# Patient Record
Sex: MALE | Race: WHITE
[De-identification: names, ages, dates, MRNs, and addresses within clinical notes are randomized per-mention and may not be internally consistent; named-entity substitution may affect disease eponyms.]

---

## 2017-08-18 ENCOUNTER — HOSPITAL ENCOUNTER (EMERGENCY)
Dept: HOSPITAL 53 - M ED | Age: 57
Discharge: HOME | End: 2017-08-18
Payer: MEDICARE

## 2017-08-18 VITALS — SYSTOLIC BLOOD PRESSURE: 134 MMHG | DIASTOLIC BLOOD PRESSURE: 67 MMHG

## 2017-08-18 VITALS — WEIGHT: 28.26 LBS | BODY MASS INDEX: 3.75 KG/M2 | HEIGHT: 73 IN

## 2017-08-18 DIAGNOSIS — Z79.84: ICD-10-CM

## 2017-08-18 DIAGNOSIS — Z79.899: ICD-10-CM

## 2017-08-18 DIAGNOSIS — R07.89: Primary | ICD-10-CM

## 2017-08-18 DIAGNOSIS — E78.5: ICD-10-CM

## 2017-08-18 DIAGNOSIS — R51: ICD-10-CM

## 2017-08-18 DIAGNOSIS — E11.9: ICD-10-CM

## 2017-08-18 LAB
ANION GAP SERPL CALC-SCNC: 9 MEQ/L (ref 8–16)
BASOPHILS # BLD AUTO: 0 K/MM3 (ref 0–0.2)
BASOPHILS NFR BLD AUTO: 0.6 % (ref 0–1)
BUN SERPL-MCNC: 13 MG/DL (ref 7–18)
CALCIUM SERPL-MCNC: 9 MG/DL (ref 8.5–10.1)
CHLORIDE SERPL-SCNC: 112 MEQ/L (ref 98–107)
CO2 SERPL-SCNC: 25 MEQ/L (ref 21–32)
CREAT SERPL-MCNC: 0.95 MG/DL (ref 0.7–1.3)
EOSINOPHIL # BLD AUTO: 0.2 K/MM3 (ref 0–0.5)
EOSINOPHIL NFR BLD AUTO: 2.9 % (ref 0–3)
ERYTHROCYTE [DISTWIDTH] IN BLOOD BY AUTOMATED COUNT: 13.1 % (ref 11.5–14.5)
GFR SERPL CREATININE-BSD FRML MDRD: > 60 ML/MIN/{1.73_M2} (ref 56–?)
GLUCOSE SERPL-MCNC: 104 MG/DL (ref 70–105)
LARGE UNSTAINED CELL #: 0.2 K/MM3 (ref 0–0.4)
LARGE UNSTAINED CELL %: 2.3 % (ref 0–4)
LYMPHOCYTES # BLD AUTO: 2.5 K/MM3 (ref 1.5–4.5)
LYMPHOCYTES NFR BLD AUTO: 29.9 % (ref 24–44)
MCH RBC QN AUTO: 29.9 PG (ref 27–33)
MCHC RBC AUTO-ENTMCNC: 34.5 G/DL (ref 32–36.5)
MCV RBC AUTO: 86.6 FL (ref 80–96)
MONOCYTES # BLD AUTO: 0.5 K/MM3 (ref 0–0.8)
MONOCYTES NFR BLD AUTO: 6 % (ref 0–5)
NEUTROPHILS # BLD AUTO: 4.5 K/MM3 (ref 1.8–7.7)
NEUTROPHILS NFR BLD AUTO: 58.3 % (ref 36–66)
PLATELET # BLD AUTO: 258 K/MM3 (ref 150–450)
POTASSIUM SERPL-SCNC: 4.2 MEQ/L (ref 3.5–5.1)
SODIUM SERPL-SCNC: 146 MEQ/L (ref 136–145)
WBC # BLD AUTO: 7.7 K/MM3 (ref 4–10)

## 2017-08-18 PROCEDURE — 82553 CREATINE MB FRACTION: CPT

## 2017-08-18 PROCEDURE — 83880 ASSAY OF NATRIURETIC PEPTIDE: CPT

## 2017-08-18 PROCEDURE — 85025 COMPLETE CBC W/AUTO DIFF WBC: CPT

## 2017-08-18 PROCEDURE — 71275 CT ANGIOGRAPHY CHEST: CPT

## 2017-08-18 PROCEDURE — 80048 BASIC METABOLIC PNL TOTAL CA: CPT

## 2017-08-18 PROCEDURE — 93005 ELECTROCARDIOGRAM TRACING: CPT

## 2017-08-18 PROCEDURE — 84484 ASSAY OF TROPONIN QUANT: CPT

## 2017-08-18 PROCEDURE — 71010: CPT

## 2017-08-18 PROCEDURE — 93041 RHYTHM ECG TRACING: CPT

## 2017-08-18 PROCEDURE — 96374 THER/PROPH/DIAG INJ IV PUSH: CPT

## 2017-08-18 PROCEDURE — 82550 ASSAY OF CK (CPK): CPT

## 2017-08-18 PROCEDURE — 99285 EMERGENCY DEPT VISIT HI MDM: CPT

## 2017-08-18 PROCEDURE — 94760 N-INVAS EAR/PLS OXIMETRY 1: CPT

## 2017-08-18 NOTE — REP
CT ANGIOGRAM OF THE CHEST:

 

TECHNIQUE:  Axial contrast enhanced images from the thoracic inlet to the upper

abdomen using 100 mL Isovue 370 intravenous contrast material with multiplanar

reformations.

 

There are bibasilar fibro atelectatic changes. No acute infiltrate is seen in

either lung. There is no thoracic aortic aneurysm or dissection. Pulmonary

arteries are not optimally visualized due to breathing motion but no central

pulmonary embolism is seen. There is no mediastinal, hilar, or chest wall

lymphadenopathy. There is no pleural or pericardial effusion. Heart is normal in

size. Visualized upper abdominal structures appear unremarkable.

 

IMPRESSION:

 

No thoracic aortic aneurysm or dissection. No central pulmonary embolism, the

more peripheral pulmonary artery branches are not optimally seen due to breathing

motion.

 

 

Signed by

Kodak Bird MD 08/21/2017 08:59 A

## 2017-08-18 NOTE — REP
PORTABLE CHEST, SINGLE VIEW:

 

COMPARISON: 07/03/2014

 

There is mild bibasilar fibroatelectatic change. There is no acute infiltrate.

The heart is normal in size.  Mediastinal silhouette is unremarkable.

 

IMPRESSION:

 

Mild bibasilar fibroatelectatic change.  No acute infiltrate.

 

 

Signed by

Kodak Bird MD 08/18/2017 04:47 P

## 2017-08-19 NOTE — ECGEPIP
Stationary ECG Study

                           [facility] - ED

                                       

                                       Test Date:    2017

Pat Name:     NOY PLATA           Department:   

Patient ID:   [id number]                 Room:         -

Gender:       M                        Technician:   rn

:          1960               Requested By: MARY DIEGO

Order Number: [id number]     Reading MD:   Angie Damon

                                 Measurements

Intervals                              Axis          

Rate:         78                       P:            34

WV:           183                      QRS:          23

QRSD:         97                       T:            1

QT:           363                                    

QTc:          414                                    

                           Interpretive Statements

SINUS RHYTHM

NSTTW ABNORMALITY

DECREASED RATE 16

Electronically Signed On 2017 8:15:45 EDT by Angie Damon

## 2017-10-05 ENCOUNTER — HOSPITAL ENCOUNTER (OUTPATIENT)
Dept: HOSPITAL 53 - M SLEEP | Age: 57
End: 2017-10-05
Attending: NURSE PRACTITIONER
Payer: MEDICARE

## 2017-10-05 DIAGNOSIS — G47.33: Primary | ICD-10-CM

## 2017-10-10 NOTE — SLEEPCENT
DATE OF PROCEDURE:   10/05/2017

 

 

REQUESTING PROVIDER:  Dalila Coyle NP

 

INTERPRETATION: Nocturnal polysomnography was performed due to concern for the

obstructive sleep apnea syndrome in this patient with a prior history of

obstructive sleep apnea, comorbidities of left ventricular hypertrophy and

posttraumatic stress disorder (PTSD).

 

6 hours and 54 minutes of data were reviewed.  There were 263 minutes of sleep

identified.  Sleep latency was prolonged at 47 minutes.  Rapid eye movement (REM)

latency was prolonged at 236 minutes. Sleep architecture showed poor progression

with two brief REM episodes noted.  Overall sleep efficiency was 64.5%.

Electrocardiogram (EKG) showed a sinus rhythm with frequency PVCs.  Average heart

rate 62 beats per minute.   Electroencephalogram (EEG) showed some coarsening in

the background.  No focal events were identified.  There 38 respiratory events

identified of 10 seconds in duration or greater for an apnea hypopnea index of

8.7.   The events were primarily obstructive, not exclusive to sleep stage nor

body posture.  Respiratory related arousals occurred 1.6 times per hour.  Some

limb activity was noted but arousals from limb events were few.  Oxygen

desaturations surrounding respiratory events fell into the 80s.

 

IMPRESSION:  Obstructive sleep apnea syndrome (G47.33).  Apnea-hypopnea index of

8.7.

 

RECOMMENDATIONS:   The patient should be encouraged to return to the sleep

disorder center for pressure therapy.  In the interim, alcohol and sedative

avoidance should be practiced and caution exercised during the operation of motor

vehicles.

## 2019-02-06 ENCOUNTER — HOSPITAL ENCOUNTER (EMERGENCY)
Dept: HOSPITAL 53 - M ED | Age: 59
Discharge: LEFT BEFORE DISCHARGE | End: 2019-02-06
Payer: MEDICARE

## 2019-02-06 VITALS — SYSTOLIC BLOOD PRESSURE: 145 MMHG | DIASTOLIC BLOOD PRESSURE: 82 MMHG

## 2019-02-06 VITALS — HEIGHT: 72 IN | WEIGHT: 285.61 LBS | BODY MASS INDEX: 38.68 KG/M2

## 2019-02-06 DIAGNOSIS — R07.9: Primary | ICD-10-CM

## 2019-02-06 DIAGNOSIS — G20: ICD-10-CM

## 2019-02-06 DIAGNOSIS — E78.5: ICD-10-CM

## 2019-02-06 LAB
ALBUMIN SERPL BCG-MCNC: 3.3 GM/DL (ref 3.2–5.2)
ALT SERPL W P-5'-P-CCNC: 62 U/L (ref 12–78)
BASOPHILS # BLD AUTO: 0 10^3/UL (ref 0–0.2)
BASOPHILS NFR BLD AUTO: 0.3 % (ref 0–1)
BILIRUB CONJ SERPL-MCNC: < 0.1 MG/DL (ref 0–0.2)
BILIRUB SERPL-MCNC: 0.3 MG/DL (ref 0.2–1)
BUN SERPL-MCNC: 10 MG/DL (ref 7–18)
CALCIUM SERPL-MCNC: 8.5 MG/DL (ref 8.8–10.2)
CHLORIDE SERPL-SCNC: 99 MEQ/L (ref 98–107)
CK MB CFR.DF SERPL CALC: 2.43
CK MB SERPL-MCNC: 2 NG/ML (ref ?–3.6)
CK SERPL-CCNC: 70 U/L (ref 39–308)
CO2 SERPL-SCNC: 26 MEQ/L (ref 21–32)
CREAT SERPL-MCNC: 0.93 MG/DL (ref 0.7–1.3)
D DIMER PPP DDU-MCNC: < 270 NG/ML (ref ?–500)
EOSINOPHIL # BLD AUTO: 0.2 10^3/UL (ref 0–0.5)
EOSINOPHIL NFR BLD AUTO: 2.5 % (ref 0–3)
GFR SERPL CREATININE-BSD FRML MDRD: > 60 ML/MIN/{1.73_M2} (ref 49–?)
GLUCOSE SERPL-MCNC: 424 MG/DL (ref 70–100)
HCT VFR BLD AUTO: 40.8 % (ref 42–52)
HGB BLD-MCNC: 13.7 G/DL (ref 13.5–17.5)
INR PPP: 0.91
LIPASE SERPL-CCNC: 211 U/L (ref 73–393)
LYMPHOCYTES # BLD AUTO: 2.2 10^3/UL (ref 1.5–4.5)
LYMPHOCYTES NFR BLD AUTO: 25.2 % (ref 24–44)
MAGNESIUM SERPL-MCNC: 1.9 MG/DL (ref 1.8–2.4)
MCH RBC QN AUTO: 29.3 PG (ref 27–33)
MCHC RBC AUTO-ENTMCNC: 33.6 G/DL (ref 32–36.5)
MCV RBC AUTO: 87.2 FL (ref 80–96)
MONOCYTES # BLD AUTO: 0.7 10^3/UL (ref 0–0.8)
MONOCYTES NFR BLD AUTO: 7.6 % (ref 0–5)
NEUTROPHILS # BLD AUTO: 5.6 10^3/UL (ref 1.8–7.7)
NEUTROPHILS NFR BLD AUTO: 64.1 % (ref 36–66)
NT-PRO BNP: 22 PG/ML (ref ?–125)
PLATELET # BLD AUTO: 259 10^3/UL (ref 150–450)
POTASSIUM SERPL-SCNC: 4 MEQ/L (ref 3.5–5.1)
PROT SERPL-MCNC: 7.4 GM/DL (ref 6.4–8.2)
PROTHROMBIN TIME: 12.3 SECONDS (ref 12.1–14.4)
RBC # BLD AUTO: 4.68 10^6/UL (ref 4.3–6.1)
SODIUM SERPL-SCNC: 133 MEQ/L (ref 136–145)
TROPONIN I SERPL-MCNC: < 0.02 NG/ML (ref ?–0.1)
TSH SERPL DL<=0.005 MIU/L-ACNC: 3.53 UIU/ML (ref 0.36–3.74)
WBC # BLD AUTO: 8.7 10^3/UL (ref 4–10)

## 2019-02-06 NOTE — REP
Chest one-view

 

HISTORY: Chest pain

 

Comparison: 04/01/2018

 

The lungs are clear.  The heart is normal in size.  The pulmonary vasculature is

normal in appearance.

 

Impression:  No acute disease.

 

 

Electronically Signed by

Kenrick Alvarado MD 02/06/2019 11:08 A

## 2019-02-06 NOTE — ECGEPIP
Stationary ECG Study

                           [facility] - ED

                                       

                                       Test Date:    2019

Pat Name:     NOY PLATA           Department:   

Patient ID:   [id number]                 Room:         -

Gender:       M                        Technician:   

:          1960               Requested By: Angie Damon 

Order Number: [id number]     Reading MD:   Nomi Freedman

                                 Measurements

Intervals                              Axis          

Rate:         70                       P:            53

NE:           140                      QRS:          43

QRSD:         105                      T:            3

QT:           379                                    

QTc:          410                                    

                           Interpretive Statements

SINUS RHYTHM WITH OCCASIONAL PREMATURE VENTRICULAR COMPLEXES

NONSPECIFIC T-WAVE ABNORMALITY

SIMILAR TO 18

 

Electronically Signed On 2019 21:10:45 EST by Nomi Freedman

## 2019-12-16 ENCOUNTER — HOSPITAL ENCOUNTER (OUTPATIENT)
Dept: HOSPITAL 53 - M RAD | Age: 59
End: 2019-12-16
Payer: MEDICARE

## 2019-12-16 DIAGNOSIS — R22.1: Primary | ICD-10-CM

## 2019-12-16 PROCEDURE — 70491 CT SOFT TISSUE NECK W/DYE: CPT

## 2019-12-16 PROCEDURE — 70470 CT HEAD/BRAIN W/O & W/DYE: CPT

## 2019-12-16 NOTE — REP
CT brain:  New 12/16/2019.

 

Indication:  Neck mass.  Metastatic workup.

 

Comparison:  07/03/2014.

 

Technique:  Axial images of the brain were obtained from skull base to vertex

including post contrast imaging.  75 ml IV Isovue 370 were administered.

 

Findings:

 

There is no acute intracranial hemorrhage, acute cortical infarction, mass

effect, hydrocephalus or pathologic contrast enhancement.  Mild diffuse volume

loss is present.  There is no significant fluid within the paranasal

sinuses/mastoid air cells.  The calvarium is intact.

 

Impression:

 

No acute intracranial process or evidence of intracranial metastatic disease.

 

 

Electronically Signed by

Juanito Alaniz DO 12/16/2019 04:29 P

## 2019-12-16 NOTE — REP
CT neck:  12/16/2019.

 

Indication:  Neck mass.

 

Comparison:  None.

 

Technique:  Axial imaging of the neck soft tissues were obtained following IV

administration of 75 ml Isovue 370.  Sagittal and coronal reconstructions were

provided.

 

Findings:

 

There is no abnormal solid soft tissue mass, abnormal fluid collection or

cervical lymphadenopathy.  No significant vascular abnormalities are detected.

Visualized lungs are clear.  The airway is patent.  No ocular, intraorbital or

intracranial abnormalities are detected.

 

Impression:

 

No abnormal solid soft tissue mass, abnormal fluid collection or cervical

lymphadenopathy.

 

 

Electronically Signed by

Juanito Alaniz DO 12/16/2019 04:59 P

## 2020-02-12 ENCOUNTER — HOSPITAL ENCOUNTER (OUTPATIENT)
Dept: HOSPITAL 53 - M LABNEURO | Age: 60
End: 2020-02-12
Attending: PHYSICIAN ASSISTANT
Payer: MEDICARE

## 2020-02-12 DIAGNOSIS — R25.1: Primary | ICD-10-CM

## 2020-02-18 ENCOUNTER — HOSPITAL ENCOUNTER (OUTPATIENT)
Dept: HOSPITAL 53 - M OPP | Age: 60
Discharge: HOME | End: 2020-02-18
Attending: INTERNAL MEDICINE
Payer: MEDICARE

## 2020-02-18 VITALS — WEIGHT: 280 LBS | BODY MASS INDEX: 37.93 KG/M2 | HEIGHT: 72 IN

## 2020-02-18 VITALS — DIASTOLIC BLOOD PRESSURE: 94 MMHG | SYSTOLIC BLOOD PRESSURE: 173 MMHG

## 2020-02-18 DIAGNOSIS — Z79.899: ICD-10-CM

## 2020-02-18 DIAGNOSIS — Z12.11: Primary | ICD-10-CM

## 2020-02-18 DIAGNOSIS — F17.220: ICD-10-CM

## 2020-02-18 DIAGNOSIS — K64.0: ICD-10-CM

## 2020-02-18 DIAGNOSIS — Z79.891: ICD-10-CM

## 2020-02-18 NOTE — ROOR
________________________________________________________________________________

Patient Name: Kodak Ritchie           Procedure Date: 2/18/2020 12:09 PM

MRN: [id number]                          Account Number: [id number]

YOB: 1960               Age: 59

Room: [facility]                            Gender: Male

Note Status: Finalized                 

________________________________________________________________________________

 

Procedure:           Total Colonoscopy to Cecum

Indications:         Screening for colorectal malignant neoplasm

Providers:           Axel Bennett MD

Referring MD:        Kenji Diaz Md

Requesting Provider: 

Medicines:           Monitored Anesthesia Care

Complications:       No immediate complications.

________________________________________________________________________________

Procedure:           Pre-Anesthesia Assessment:

                     - The heart rate, respiratory rate, oxygen saturations, 

                     blood pressure, adequacy of pulmonary ventilation, and 

                     response to care were monitored throughout the procedure.

                     The Colonoscope was introduced through the anus and 

                     advanced to the cecum, identified by appendiceal orifice 

                     and ileocecal valve. The colonoscopy was performed 

                     without difficulty. The patient tolerated the procedure 

                     well. The quality of the bowel preparation was good.

                                                                                

Findings:

     The perianal and digital rectal examinations were normal.

     Non-bleeding internal hemorrhoids were found during retroflexion. The 

     hemorrhoids were small and Grade I (internal hemorrhoids that do not 

     prolapse).

     No other significant abnormalities were identified in a careful 

     examination of the remainder of the colon.

     The exam was otherwise without abnormality on direct and retroflexion 

     views.

                                                                                

Impression:          - Non-bleeding internal hemorrhoids.

                     - The examination was otherwise normal on direct and 

                     retroflexion views.

                     - No specimens collected.

                     - The exam was otherwise normal to the cecum.

Recommendation:      - Patient has a contact number available for emergencies. 

                     The signs and symptoms of potential delayed complications 

                     were discussed with the patient. Return to normal 

                     activities tomorrow. Written discharge instructions were 

                     provided to the patient.

                     - High fiber diet.

                     - Discharge patient to home.

                     - Continue present medications.

                     - Repeat colonoscopy in 10 years for screening purposes.

                     - Return to referring physician.

                     - The findings and recommendations were discussed with 

                     the patient's family.

                                                                                

 

Axel Bennett MD

____________________

Axel Bennett MD

2/18/2020 12:42:00 PM

Electronically signed by Axel Bennett MD

Number of Addenda: 0

 

Note Initiated On: 2/18/2020 12:09 PM

Estimated Blood Loss:

     Estimated blood loss: none.

## 2021-04-01 ENCOUNTER — HOSPITAL ENCOUNTER (OUTPATIENT)
Dept: HOSPITAL 53 - M LAB REF | Age: 61
End: 2021-04-01
Attending: PHYSICIAN ASSISTANT
Payer: MEDICARE

## 2021-04-01 DIAGNOSIS — Z79.899: ICD-10-CM

## 2021-04-01 DIAGNOSIS — R53.83: Primary | ICD-10-CM

## 2021-04-01 LAB
25(OH)D3 SERPL-MCNC: 12.2 NG/ML (ref 30–100)
ALBUMIN SERPL BCG-MCNC: 3.6 GM/DL (ref 3.2–5.2)
ALT SERPL W P-5'-P-CCNC: 43 U/L (ref 12–78)
BASOPHILS # BLD AUTO: 0.1 10^3/UL (ref 0–0.2)
BASOPHILS NFR BLD AUTO: 0.7 % (ref 0–1)
BILIRUB SERPL-MCNC: 0.2 MG/DL (ref 0.2–1)
BUN SERPL-MCNC: 16 MG/DL (ref 7–18)
CALCIUM SERPL-MCNC: 8.9 MG/DL (ref 8.8–10.2)
CHLORIDE SERPL-SCNC: 99 MEQ/L (ref 98–107)
CO2 SERPL-SCNC: 26 MEQ/L (ref 21–32)
CREAT SERPL-MCNC: 0.78 MG/DL (ref 0.7–1.3)
EOSINOPHIL # BLD AUTO: 0.2 10^3/UL (ref 0–0.5)
EOSINOPHIL NFR BLD AUTO: 3.1 % (ref 0–3)
GFR SERPL CREATININE-BSD FRML MDRD: > 60 ML/MIN/{1.73_M2} (ref 49–?)
GLUCOSE SERPL-MCNC: 280 MG/DL (ref 70–100)
HCT VFR BLD AUTO: 42.3 % (ref 42–52)
HETEROPH AB SER QL LA: NEGATIVE
HGB BLD-MCNC: 13.9 G/DL (ref 13.5–17.5)
IRON SATN MFR SERPL: 22 % (ref 19.7–50)
IRON SERPL-MCNC: 67 UG/DL (ref 65–175)
LYMPHOCYTES # BLD AUTO: 2.5 10^3/UL (ref 1.5–5)
LYMPHOCYTES NFR BLD AUTO: 34.9 % (ref 24–44)
MCH RBC QN AUTO: 28.5 PG (ref 27–33)
MCHC RBC AUTO-ENTMCNC: 32.9 G/DL (ref 32–36.5)
MCV RBC AUTO: 86.7 FL (ref 80–96)
MONOCYTES # BLD AUTO: 0.6 10^3/UL (ref 0–0.8)
MONOCYTES NFR BLD AUTO: 8.1 % (ref 2–8)
NEUTROPHILS # BLD AUTO: 3.7 10^3/UL (ref 1.5–8.5)
NEUTROPHILS NFR BLD AUTO: 52.9 % (ref 36–66)
PLATELET # BLD AUTO: 250 10^3/UL (ref 150–450)
POTASSIUM SERPL-SCNC: 4.2 MEQ/L (ref 3.5–5.1)
PROT SERPL-MCNC: 7.4 GM/DL (ref 6.4–8.2)
RBC # BLD AUTO: 4.88 10^6/UL (ref 4.3–6.1)
SODIUM SERPL-SCNC: 132 MEQ/L (ref 136–145)
T3 SERPL-MCNC: 109 NG/DL (ref 60–181)
T4 FREE SERPL-MCNC: 0.92 NG/DL (ref 0.76–1.46)
TIBC SERPL-MCNC: 305 UG/DL (ref 250–450)
TSH SERPL DL<=0.005 MIU/L-ACNC: 2.43 UIU/ML (ref 0.36–3.74)
WBC # BLD AUTO: 7.1 10^3/UL (ref 4–10)

## 2021-09-12 ENCOUNTER — HOSPITAL ENCOUNTER (OUTPATIENT)
Dept: HOSPITAL 53 - M ED | Age: 61
Setting detail: OBSERVATION
LOS: 1 days | Discharge: HOME | End: 2021-09-13
Attending: INTERNAL MEDICINE | Admitting: INTERNAL MEDICINE
Payer: MEDICARE

## 2021-09-12 VITALS — HEIGHT: 73 IN | BODY MASS INDEX: 36.35 KG/M2 | WEIGHT: 274.26 LBS

## 2021-09-12 VITALS — SYSTOLIC BLOOD PRESSURE: 131 MMHG | DIASTOLIC BLOOD PRESSURE: 86 MMHG

## 2021-09-12 VITALS — DIASTOLIC BLOOD PRESSURE: 85 MMHG | SYSTOLIC BLOOD PRESSURE: 141 MMHG

## 2021-09-12 VITALS — DIASTOLIC BLOOD PRESSURE: 86 MMHG | SYSTOLIC BLOOD PRESSURE: 132 MMHG

## 2021-09-12 DIAGNOSIS — R07.89: Primary | ICD-10-CM

## 2021-09-12 DIAGNOSIS — E66.9: ICD-10-CM

## 2021-09-12 DIAGNOSIS — M54.2: ICD-10-CM

## 2021-09-12 DIAGNOSIS — F10.11: ICD-10-CM

## 2021-09-12 DIAGNOSIS — E11.65: ICD-10-CM

## 2021-09-12 DIAGNOSIS — E53.9: ICD-10-CM

## 2021-09-12 DIAGNOSIS — F43.10: ICD-10-CM

## 2021-09-12 DIAGNOSIS — G20: ICD-10-CM

## 2021-09-12 DIAGNOSIS — G25.81: ICD-10-CM

## 2021-09-12 DIAGNOSIS — G43.909: ICD-10-CM

## 2021-09-12 DIAGNOSIS — F41.0: ICD-10-CM

## 2021-09-12 DIAGNOSIS — I10: ICD-10-CM

## 2021-09-12 DIAGNOSIS — Z79.4: ICD-10-CM

## 2021-09-12 DIAGNOSIS — Z79.84: ICD-10-CM

## 2021-09-12 DIAGNOSIS — G25.0: ICD-10-CM

## 2021-09-12 DIAGNOSIS — R55: ICD-10-CM

## 2021-09-12 DIAGNOSIS — Z79.82: ICD-10-CM

## 2021-09-12 DIAGNOSIS — M79.7: ICD-10-CM

## 2021-09-12 DIAGNOSIS — M54.5: ICD-10-CM

## 2021-09-12 DIAGNOSIS — Z79.899: ICD-10-CM

## 2021-09-12 DIAGNOSIS — F32.9: ICD-10-CM

## 2021-09-12 DIAGNOSIS — G47.33: ICD-10-CM

## 2021-09-12 DIAGNOSIS — E78.5: ICD-10-CM

## 2021-09-12 DIAGNOSIS — D35.2: ICD-10-CM

## 2021-09-12 LAB
ALBUMIN SERPL BCG-MCNC: 3.5 GM/DL (ref 3.2–5.2)
ALT SERPL W P-5'-P-CCNC: 44 U/L (ref 12–78)
BASOPHILS # BLD AUTO: 0 10^3/UL (ref 0–0.2)
BASOPHILS NFR BLD AUTO: 0.4 % (ref 0–1)
BILIRUB CONJ SERPL-MCNC: < 0.1 MG/DL (ref 0–0.2)
BILIRUB SERPL-MCNC: 0.5 MG/DL (ref 0.2–1)
BUN SERPL-MCNC: 22 MG/DL (ref 7–18)
CALCIUM SERPL-MCNC: 8.8 MG/DL (ref 8.8–10.2)
CHLORIDE SERPL-SCNC: 97 MEQ/L (ref 98–107)
CK MB CFR.DF SERPL CALC: 2.22
CK MB SERPL-MCNC: < 1 NG/ML (ref ?–3.6)
CK SERPL-CCNC: 45 U/L (ref 39–308)
CO2 SERPL-SCNC: 26 MEQ/L (ref 21–32)
CREAT SERPL-MCNC: 1.23 MG/DL (ref 0.7–1.3)
EOSINOPHIL # BLD AUTO: 0.3 10^3/UL (ref 0–0.5)
EOSINOPHIL NFR BLD AUTO: 2.4 % (ref 0–3)
GFR SERPL CREATININE-BSD FRML MDRD: > 60 ML/MIN/{1.73_M2} (ref 49–?)
GLUCOSE SERPL-MCNC: 315 MG/DL (ref 70–100)
HCT VFR BLD AUTO: 43.7 % (ref 42–52)
HGB BLD-MCNC: 14.4 G/DL (ref 13.5–17.5)
LIPASE SERPL-CCNC: 204 U/L (ref 73–393)
LYMPHOCYTES # BLD AUTO: 2.7 10^3/UL (ref 1.5–5)
LYMPHOCYTES NFR BLD AUTO: 25.8 % (ref 24–44)
MCH RBC QN AUTO: 29.3 PG (ref 27–33)
MCHC RBC AUTO-ENTMCNC: 33 G/DL (ref 32–36.5)
MCV RBC AUTO: 88.8 FL (ref 80–96)
MONOCYTES # BLD AUTO: 0.8 10^3/UL (ref 0–0.8)
MONOCYTES NFR BLD AUTO: 7.4 % (ref 2–8)
NEUTROPHILS # BLD AUTO: 6.6 10^3/UL (ref 1.5–8.5)
NEUTROPHILS NFR BLD AUTO: 63.6 % (ref 36–66)
NT-PRO BNP: 29 PG/ML (ref ?–125)
PLATELET # BLD AUTO: 295 10^3/UL (ref 150–450)
POTASSIUM SERPL-SCNC: 3.9 MEQ/L (ref 3.5–5.1)
PROT SERPL-MCNC: 7.6 GM/DL (ref 6.4–8.2)
RBC # BLD AUTO: 4.92 10^6/UL (ref 4.3–6.1)
RSV RNA NPH QL NAA+PROBE: NEGATIVE
SODIUM SERPL-SCNC: 133 MEQ/L (ref 136–145)
TROPONIN I SERPL-MCNC: < 0.02 NG/ML (ref ?–0.1)
TSH SERPL DL<=0.005 MIU/L-ACNC: 2.7 UIU/ML (ref 0.36–3.74)
WBC # BLD AUTO: 10.4 10^3/UL (ref 4–10)

## 2021-09-12 PROCEDURE — 36415 COLL VENOUS BLD VENIPUNCTURE: CPT

## 2021-09-12 PROCEDURE — 83880 ASSAY OF NATRIURETIC PEPTIDE: CPT

## 2021-09-12 PROCEDURE — 96372 THER/PROPH/DIAG INJ SC/IM: CPT

## 2021-09-12 PROCEDURE — 71045 X-RAY EXAM CHEST 1 VIEW: CPT

## 2021-09-12 PROCEDURE — 90682 RIV4 VACC RECOMBINANT DNA IM: CPT

## 2021-09-12 PROCEDURE — 93880 EXTRACRANIAL BILAT STUDY: CPT

## 2021-09-12 PROCEDURE — 93306 TTE W/DOPPLER COMPLETE: CPT

## 2021-09-12 PROCEDURE — 87631 RESP VIRUS 3-5 TARGETS: CPT

## 2021-09-12 PROCEDURE — 85025 COMPLETE CBC W/AUTO DIFF WBC: CPT

## 2021-09-12 PROCEDURE — 99285 EMERGENCY DEPT VISIT HI MDM: CPT

## 2021-09-12 PROCEDURE — 84443 ASSAY THYROID STIM HORMONE: CPT

## 2021-09-12 PROCEDURE — 82553 CREATINE MB FRACTION: CPT

## 2021-09-12 PROCEDURE — 93005 ELECTROCARDIOGRAM TRACING: CPT

## 2021-09-12 PROCEDURE — 94760 N-INVAS EAR/PLS OXIMETRY 1: CPT

## 2021-09-12 PROCEDURE — 93041 RHYTHM ECG TRACING: CPT

## 2021-09-12 PROCEDURE — 84484 ASSAY OF TROPONIN QUANT: CPT

## 2021-09-12 PROCEDURE — 82550 ASSAY OF CK (CPK): CPT

## 2021-09-12 PROCEDURE — 96374 THER/PROPH/DIAG INJ IV PUSH: CPT

## 2021-09-12 PROCEDURE — 80048 BASIC METABOLIC PNL TOTAL CA: CPT

## 2021-09-12 PROCEDURE — 83690 ASSAY OF LIPASE: CPT

## 2021-09-12 PROCEDURE — 83036 HEMOGLOBIN GLYCOSYLATED A1C: CPT

## 2021-09-12 PROCEDURE — 80076 HEPATIC FUNCTION PANEL: CPT

## 2021-09-12 PROCEDURE — 71275 CT ANGIOGRAPHY CHEST: CPT

## 2021-09-12 RX ADMIN — PREGABALIN SCH MG: 100 CAPSULE ORAL at 20:29

## 2021-09-12 RX ADMIN — INSULIN DETEMIR SCH UNITS: 100 INJECTION, SOLUTION SUBCUTANEOUS at 17:06

## 2021-09-12 RX ADMIN — INSULIN LISPRO SCH UNITS: 100 INJECTION, SOLUTION INTRAVENOUS; SUBCUTANEOUS at 17:06

## 2021-09-12 NOTE — REP
INDICATION:

CP/near syncope.



COMPARISON:

08/18/2017.



TECHNIQUE:

CT angiogram chest performed following the intravenous administration of 100 cc of

Isovue 370.  Sagittal and coronal reconstruction images are performed.



FINDINGS:

Lungs: Clear, no infiltrate or nodule.

Mediastinum: No adenopathy.

Pulmonary arteries: No evidence of pulmonary embolism.

Mariana: No adenopathy.

Axilla: No adenopathy.

Pleura: No effusion.

Heart: Not enlarged.

Thoracic aorta: No aneurysm or dissection.

Upper abdominal structures: Unremarkable.

Visualized osseous structures: There are mild degenerative changes of the spine

without compression deformity.



IMPRESSION:

No CT evidence of pulmonary embolism.   No infiltrate seen.





<Electronically signed by Kodak Bird > 09/12/21 1117

## 2021-09-12 NOTE — REP
INDICATION:

presyncope



COMPARISON:

None.



TECHNIQUE:

Real-time ultrasound evaluation and duplex Doppler interrogation of the extracranial

carotid vasculature is performed.



FINDINGS:

There is mild plaquing and narrowing in both carotid bulbs extending into the internal

and external carotid arteries.  Luminal narrowing is less than 50%.  There is no

evidence of hemodynamically significant stenosis of either internal carotid artery.

Normal flow velocities are seen. The vertebral arteries demonstrate normal direction

of flow.



RIGHT                       LEFT

Peak systolic velocity ICA                   76.7 cm/s                     76.8 cm/s

End diastolic velocity ICA                   31.7 cm/s                     31.5 cm/s

Peak systolic velocity CCA                  103.9 cm/s                     106.4cm/s

Peak systolic velocity ECA                  126.9 cm/s                     113.7 cm/s

ICA/CCA ratio                                       0.74 0.72



IMPRESSION:

Bilateral luminal narrowing of the internal carotid arteries less than 50%.  No

evidence of hemodynamically significant stenosis.





<Electronically signed by Kodak Bird > 09/12/21 [id number]

## 2021-09-12 NOTE — REP
INDICATION:

CHEST PAIN.



COMPARISON:

02/06/2019.



TECHNIQUE:

AP view of the chest was performed.



FINDINGS:

There is no acute infiltrate or pulmonary edema.  Lungs are clear.  The heart is not

significantly enlarged.  The mediastinal silhouette is unremarkable.  The visualized

osseous structures are intact.



IMPRESSION:

No acute pulmonary disease.





<Electronically signed by Kodak Bird > 09/12/21 0852
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
## 2021-09-12 NOTE — ECGEPIP
[facility] - ED

                                       

                                       Test Date:    2021

Pat Name:     NOY PLATA           Department:   

Patient ID:   [id number]                 Room:         -

Gender:       Male                     Technician:   CALLY

:          1960               Requested By: Angie Damon 

Order Number: [id number]     Reading MD:   Angie Damon

                                 Measurements

Intervals                              Axis          

Rate:         107                      P:            57

OH:           168                      QRS:          79

QRSD:         98                       T:            2

QT:           354                                    

QTc:          472                                    

                           Interpretive Statements

Sinus tachycardia

T wave abnormality, consider ischemia, increased and increased rate 19

Electronically Signed on 2021 20:07:51 EDT by Angie Damon

## 2021-09-13 VITALS — DIASTOLIC BLOOD PRESSURE: 80 MMHG | SYSTOLIC BLOOD PRESSURE: 135 MMHG

## 2021-09-13 LAB
BASOPHILS # BLD AUTO: 0 10^3/UL (ref 0–0.2)
BASOPHILS NFR BLD AUTO: 0.5 % (ref 0–1)
BUN SERPL-MCNC: 19 MG/DL (ref 7–18)
CALCIUM SERPL-MCNC: 8.7 MG/DL (ref 8.8–10.2)
CHLORIDE SERPL-SCNC: 106 MEQ/L (ref 98–107)
CO2 SERPL-SCNC: 25 MEQ/L (ref 21–32)
CREAT SERPL-MCNC: 0.64 MG/DL (ref 0.7–1.3)
EOSINOPHIL # BLD AUTO: 0.3 10^3/UL (ref 0–0.5)
EOSINOPHIL NFR BLD AUTO: 4.4 % (ref 0–3)
EST. AVERAGE GLUCOSE BLD GHB EST-MCNC: 260 MG/DL (ref 60–110)
GFR SERPL CREATININE-BSD FRML MDRD: > 60 ML/MIN/{1.73_M2} (ref 49–?)
GLUCOSE SERPL-MCNC: 210 MG/DL (ref 70–100)
HCT VFR BLD AUTO: 39.3 % (ref 42–52)
HGB BLD-MCNC: 13 G/DL (ref 13.5–17.5)
LYMPHOCYTES # BLD AUTO: 2.7 10^3/UL (ref 1.5–5)
LYMPHOCYTES NFR BLD AUTO: 34.5 % (ref 24–44)
MCH RBC QN AUTO: 28.9 PG (ref 27–33)
MCHC RBC AUTO-ENTMCNC: 33.1 G/DL (ref 32–36.5)
MCV RBC AUTO: 87.3 FL (ref 80–96)
MONOCYTES # BLD AUTO: 0.7 10^3/UL (ref 0–0.8)
MONOCYTES NFR BLD AUTO: 8.7 % (ref 2–8)
NEUTROPHILS # BLD AUTO: 4 10^3/UL (ref 1.5–8.5)
NEUTROPHILS NFR BLD AUTO: 51.5 % (ref 36–66)
PLATELET # BLD AUTO: 227 10^3/UL (ref 150–450)
POTASSIUM SERPL-SCNC: 4.1 MEQ/L (ref 3.5–5.1)
RBC # BLD AUTO: 4.5 10^6/UL (ref 4.3–6.1)
SODIUM SERPL-SCNC: 137 MEQ/L (ref 136–145)
WBC # BLD AUTO: 7.7 10^3/UL (ref 4–10)

## 2021-09-13 RX ADMIN — INSULIN LISPRO SCH UNITS: 100 INJECTION, SOLUTION INTRAVENOUS; SUBCUTANEOUS at 08:38

## 2021-09-13 RX ADMIN — DICLOFENAC EPOLAMINE SCH PATCH: 0.01 SYSTEM TOPICAL at 08:36

## 2021-09-13 RX ADMIN — DICLOFENAC EPOLAMINE SCH PATCH: 0.01 SYSTEM TOPICAL at 01:52

## 2021-09-13 RX ADMIN — INSULIN DETEMIR SCH UNITS: 100 INJECTION, SOLUTION SUBCUTANEOUS at 08:38

## 2021-09-13 RX ADMIN — PREGABALIN SCH MG: 100 CAPSULE ORAL at 08:39

## 2021-09-13 NOTE — DS.PDOC
Discharge Summary


General


Date of Admission


Sep 12, 2021 at 08:06


Date of Discharge


9/13/21





Discharge Summary


PROCEDURES PERFORMED DURING STAY: [None].





DISCHARGE DIAGNOSES:


Presyncope possibly vasovagal


Atypical chest pain ruled out ACS


Uncontrolled diabetes





Secondary diagnosis


diabetes, hypertension, hyperlipidemia, MANUEL, obesity,  pituitary adenoma, 

essential tremor, Parkinson's, restless leg syndrome, chronic depression, PTSD, 

panic disorder with agoraphobia, alcohol abuse in remission, degenerative disc 

disease, chronic low back pain, carpal tunnel syndrome, cervical radiculopathy, 

hemorrhoids, vitamin B12 deficiency, left varicocele, fibromyalgia, migraines,





COMPLICATIONS/CHIEF COMPLAINT: Pre-Syncope.





HOSPITAL COURSE: 61-year-old male with history of diabetes, hypertension, 

hyperlipidemia, MANUEL, PTSD, depression, chronic back pain, radiculopathy, 

presented to the ED with complaints of chest pain and dizziness and 

lightheadedness which almost made him pass out.   In the ED his EKG shows sinus 

rhythm regular rate.  His vitals were stable.  His labs were within normal 

limits except for elevated glucose of over 300.  He had a two sets of cardiac 

troponins which were negative.  He had a CT angio of the chest which was 

negative for Pulmonary embolism.  He is admitted for evaluation of presyncope 

and chest pain.





Presyncopal episode 


associated with sweating and sensation of heat


Likely vasovagal


Negative orthostatic hypotension


36-hour telemetry without any cardiac arrhythmia


carotid Doppler less than 50% obstruction





Atypical chest pain


No EKG changes suggestive of acute ischemia at present and 2 sets of troponins 

have been negative.


Echo was ordered however patient did not want to stay longer to get it done.  He

 said he is going to get a referral to cardiology from his PMD and get it done 

as an outpatient.


Will need further cardiac work-up as an outpatient as him being a diabetic he is

 very high risk of CAD.


We will continue with aspirin and statin





Diabetes


Uncontrolled


Continue home


Started him on Lantus insulin


Fingerstick twice a day


Follow-up with PMD





Hyperlipidemia 


continue fenofibrate and statin





Depression/PTSD


Continue bupropion, Abilify, sertraline





Essential tremors


Continue primidone





Restless leg syndrome


Continue ropinirole





Chronic back pain radiculopathy leg neuropathy


Continue Lyrica, meloxicam





DISCHARGE MEDICATIONS: Please see below.


 


ALLERGIES: Please see below.





PHYSICAL EXAMINATION ON DISCHARGE:


VITAL SIGNS: Please see below.


General Exam:  Positive: Alert, Cooperative, No Acute Distress


Eye Exam:  Positive: PERRLA, Conjunctiva & lids normal, EOMI; 


   Negative: Sclera icteric


ENT Exam:  Positive: Atraumatic, Mucous membr. moist/pink, Pharynx Normal


Neck Exam:  Positive: Supple; 


   Negative: JVD, thyromegaly


Chest Exam:  Positive: Clear to auscultation, Diminished (Distant breath sounds 

all over)


Heart Exam:  Positive: Rate Normal, Regular Rhythm, Normal S1, Normal S2; 


   Negative: Murmurs, Rubs


Telemetry:  Positive: No significant arrhythmia


Abdomen Exam:  Positive: Normal bowel sounds, Soft, Other (Of); 


   Negative: Tenderness


Extremity Exam:  Negative: Clubbing, Cyanosis, Edema


Skin Exam:  Positive: Nl turgor and temperature; 


   Negative: Breakdown, Lesion


Psych Exam:  Positive: Memory Intact, Oriented x 3





LABORATORY DATA: Please see below.





ACTIVITY: [As tolerated].





DIET: Carb consistent





DISPOSITION: 01 Home, Self-Care.





DISCHARGE INSTRUCTIONS:


PMD in 1 week


Needs referral to cardiology for work-up of coronary artery disease


Needs an echo





DISCHARGE CONDITION: [Stable].





TIME SPENT ON DISCHARGE: 35 minutes.





Vital Signs/I&Os





Vital Signs








  Date Time  Temp Pulse Resp B/P (MAP) Pulse Ox O2 Delivery O2 Flow Rate FiO2


 


9/13/21 06:18  91  138/81 (100)    





  92  135/80 (98)    





  105  127/85 (99)    


 


9/13/21 06:00 98.3  19  93 Room Air  














I&O- Last 24 Hours up to 6 AM 


 


 9/13/21





 05:59


 


Intake Total 700 ml


 


Balance 700 ml











Laboratory Data


Labs 24H


Laboratory Tests 2


9/12/21 13:46: 


Coronavirus (COVID-19)(PCR) NEGATIVE, Influenza Type A (RT-PCR) NEGATIVE, 

Influenza Type B (RT-PCR) NEGATIVE, Respiratory Syncytial Virus (PCR) NEGATIVE


9/12/21 16:49: Bedside Glucose (Misc Panel) 329H


9/12/21 17:47: 


Total Creatine Kinase 45, Creatine Kinase MB < 1.0, Creatine Kinase MB Relative 

Index 2.22, Troponin I < 0.02


9/12/21 20:08: Bedside Glucose (Misc Panel) 337H


9/13/21 05:33: 


Immature Granulocyte % (Auto) 0.4, Neutrophils (%) (Auto) 51.5, Lymphocytes (%) 

(Auto) 34.5, Monocytes (%) (Auto) 8.7H, Eosinophils (%) (Auto) 4.4H, Basophils 

(%) (Auto) 0.5, Neutrophils # (Auto) 4.0, Lymphocytes # (Auto) 2.7, Monocytes # 

(Auto) 0.7, Eosinophils # (Auto) 0.3, Basophils # (Auto) 0.0, Nucleated Red B

lood Cells % (auto) 0.0, Anion Gap 6L, Glomerular Filtration Rate > 60.0, 

Calcium Level 8.7L


CBC/BMP


Laboratory Tests


9/13/21 05:33








FSBS





Laboratory Tests








Test


 9/12/21


16:49 9/12/21


20:08 Range/Units


 


 


Bedside Glucose (Misc Panel) 329 337   MG/DL











Discharge Medications


Scheduled


Acarbose (Acarbose) 50 Mg Tablet, 50 MG PO TID, (Reported)


Aripiprazole (Abilify) 10 Mg Tablet, 10 MG PO DAILY, (Reported)


Aspirin (Ecotrin) 81 Mg Tablet.dr, 81 MG PO DAILY, (Reported)


Blood Sugar Diagnostic (Advanced Glucose Test Strips) 1 Each Strip, 100 STRIP XX

 BID


Bupropion HCl (Bupropion Xl) 300 Mg Tab.er.24h, 300 MG PO DAILY, (Reported)


Cholecalciferol (Vitamin D3) (Vitamin D3) 1,000 Unit Tablet, 3,000 UNITS PO 

DAILY, (Reported)


Empagliflozin (Jardiance) 25 Mg Tablet, 25 MG PO DAILY, (Reported)


Fenofibrate Nanocrystallized (Fenofibrate) 145 Mg Tablet, 145 MG PO DAILY, 

(Reported)


Insulin Glargine,Hum.rec.anlog (Lantus Solostar) 100 Unit/1 Ml Insuln.pen, 10 

UNIT SC QPM


Meloxicam (Meloxicam) 15 Mg Tablet, 7.5 MG PO DAILY, (Reported)


Metformin HCl (Metformin HCl) 1,000 Mg Tablet, 1,000 MG PO BID, (Reported)


Potassium Chloride (Potassium Chloride) 10 Meq Capsule.er, 10 MEQ PO DAILY, (R

eported)


Prazosin Hcl (Prazosin HCl) 5 Mg Cap, 10 MG PO QHS, (Reported)


Pregabalin (Lyrica) 100 Mg Cap, 100 MG PO BID, (Reported)


Primidone (Primidone) 250 Mg Tab, 250 MG PO DAILY, (Reported)


Ropinirole HCl (Ropinirole HCl) 2 Mg Tablet, 2 MG PO QHS, (Reported)


Rosuvastatin Calcium (Rosuvastatin Calcium) 40 Mg Tablet, 40 MG PO DAILY, (Repo

rted)


Sertraline Hcl (Zoloft) 100 Mg Tablet, 100 MG PO DAILY, (Reported)





Scheduled PRN


Albuterol Sulfate (Ventolin Hfa) 18 Gm Hfa.aer.ad, 2 PUFFS INH QID PRN for 

SOB/WHEEZING, (Reported)


Hydroxyzine HCl (Hydroxyzine HCl) 10 Mg Tablet, 10 MG PO BID PRN for ANXIETY, 

(Reported)


Tramadol HCl (Tramadol HCl) 50 Mg Tablet, 50 MG PO DAILY PRN for PAINFUL 

PROCEDURES, (Reported)





Allergies


Coded Allergies:  


     No Known Allergies (Verified , 2/17/20)











Madeline Turner MD                   Sep 13, 2021 13:41

## 2021-09-14 NOTE — ECGEPIP
[facility]

                                       

                                       Test Date:    2021

Pat Name:     NOY PLATA           Department:   

Patient ID:   [id number]                 Room:         [place] [id number]

Gender:       Male                     Technician:   vrena

:          1960               Requested By: Madeline Turner 

Order Number: [id number]     Reading MD:   Jacki Crook

                                 Measurements

Intervals                              Axis          

Rate:         88                       P:            53

MD:           182                      QRS:          68

QRSD:         88                       T:            54

QT:           370                                    

QTc:          447                                    

                           Interpretive Statements

Normal sinus rhythm

COMPARED TO 21 HR is slower and STT abnormalities are no longer apparent

Electronically Signed on 2021 17:50:12 EDT by Jacki Crook

## 2022-10-05 ENCOUNTER — HOSPITAL ENCOUNTER (EMERGENCY)
Dept: HOSPITAL 53 - M ED | Age: 62
Discharge: HOME | End: 2022-10-05
Payer: MEDICARE

## 2022-10-05 VITALS — BODY MASS INDEX: 36.58 KG/M2 | WEIGHT: 270.07 LBS | HEIGHT: 72 IN

## 2022-10-05 VITALS — DIASTOLIC BLOOD PRESSURE: 98 MMHG | SYSTOLIC BLOOD PRESSURE: 164 MMHG

## 2022-10-05 DIAGNOSIS — Z79.4: ICD-10-CM

## 2022-10-05 DIAGNOSIS — U07.1: Primary | ICD-10-CM

## 2022-10-05 DIAGNOSIS — Z79.899: ICD-10-CM

## 2022-10-05 DIAGNOSIS — E78.5: ICD-10-CM

## 2022-10-05 DIAGNOSIS — Z87.442: ICD-10-CM

## 2022-10-05 DIAGNOSIS — Z79.51: ICD-10-CM

## 2022-10-05 DIAGNOSIS — E11.9: ICD-10-CM

## 2022-10-05 DIAGNOSIS — K21.9: ICD-10-CM

## 2022-10-05 LAB
BASOPHILS # BLD AUTO: 0 10^3/UL (ref 0–0.2)
BASOPHILS NFR BLD AUTO: 0.4 % (ref 0–1)
BUN SERPL-MCNC: 17 MG/DL (ref 7–18)
CALCIUM SERPL-MCNC: 9 MG/DL (ref 8.8–10.2)
CHLORIDE SERPL-SCNC: 104 MEQ/L (ref 98–107)
CK MB CFR.DF SERPL CALC: 0.84
CK MB CFR.DF SERPL CALC: 0.94
CK MB SERPL-MCNC: 1.3 NG/ML (ref ?–3.6)
CK MB SERPL-MCNC: < 1 NG/ML (ref ?–3.6)
CK SERPL-CCNC: 119 U/L (ref 39–308)
CK SERPL-CCNC: 138 U/L (ref 39–308)
CO2 SERPL-SCNC: 27 MEQ/L (ref 21–32)
CREAT SERPL-MCNC: 1.03 MG/DL (ref 0.7–1.3)
EOSINOPHIL # BLD AUTO: 0.1 10^3/UL (ref 0–0.5)
EOSINOPHIL NFR BLD AUTO: 1.1 % (ref 0–3)
GFR SERPL CREATININE-BSD FRML MDRD: > 60 ML/MIN/{1.73_M2} (ref 49–?)
GLUCOSE SERPL-MCNC: 260 MG/DL (ref 70–100)
HCT VFR BLD AUTO: 41.2 % (ref 42–52)
HGB BLD-MCNC: 13 G/DL (ref 13.5–17.5)
LYMPHOCYTES # BLD AUTO: 2 10^3/UL (ref 1.5–5)
LYMPHOCYTES NFR BLD AUTO: 26.3 % (ref 24–44)
MCH RBC QN AUTO: 28.3 PG (ref 27–33)
MCHC RBC AUTO-ENTMCNC: 31.6 G/DL (ref 32–36.5)
MCV RBC AUTO: 89.8 FL (ref 80–96)
MONOCYTES # BLD AUTO: 1.1 10^3/UL (ref 0–0.8)
MONOCYTES NFR BLD AUTO: 15.1 % (ref 2–8)
NEUTROPHILS # BLD AUTO: 4.2 10^3/UL (ref 1.5–8.5)
NEUTROPHILS NFR BLD AUTO: 56.8 % (ref 36–66)
PLATELET # BLD AUTO: 275 10^3/UL (ref 150–450)
POTASSIUM SERPL-SCNC: 3.9 MEQ/L (ref 3.5–5.1)
RBC # BLD AUTO: 4.59 10^6/UL (ref 4.3–6.1)
SODIUM SERPL-SCNC: 136 MEQ/L (ref 136–145)
WBC # BLD AUTO: 7.4 10^3/UL (ref 4–10)

## 2022-10-05 PROCEDURE — 82553 CREATINE MB FRACTION: CPT

## 2022-10-05 PROCEDURE — 71275 CT ANGIOGRAPHY CHEST: CPT

## 2022-10-05 PROCEDURE — 93041 RHYTHM ECG TRACING: CPT

## 2022-10-05 PROCEDURE — 84484 ASSAY OF TROPONIN QUANT: CPT

## 2022-10-05 PROCEDURE — 99285 EMERGENCY DEPT VISIT HI MDM: CPT

## 2022-10-05 PROCEDURE — 85025 COMPLETE CBC W/AUTO DIFF WBC: CPT

## 2022-10-05 PROCEDURE — 36415 COLL VENOUS BLD VENIPUNCTURE: CPT

## 2022-10-05 PROCEDURE — 80048 BASIC METABOLIC PNL TOTAL CA: CPT

## 2022-10-05 PROCEDURE — 71045 X-RAY EXAM CHEST 1 VIEW: CPT

## 2022-10-05 PROCEDURE — 82550 ASSAY OF CK (CPK): CPT

## 2022-10-05 PROCEDURE — 94760 N-INVAS EAR/PLS OXIMETRY 1: CPT

## 2022-10-05 PROCEDURE — 93005 ELECTROCARDIOGRAM TRACING: CPT

## 2023-01-17 ENCOUNTER — HOSPITAL ENCOUNTER (EMERGENCY)
Dept: HOSPITAL 53 - M ED | Age: 63
Discharge: HOME | End: 2023-01-17
Payer: MEDICARE

## 2023-01-17 VITALS — DIASTOLIC BLOOD PRESSURE: 69 MMHG | SYSTOLIC BLOOD PRESSURE: 128 MMHG

## 2023-01-17 VITALS — WEIGHT: 266.54 LBS | BODY MASS INDEX: 35.33 KG/M2 | HEIGHT: 73 IN

## 2023-01-17 DIAGNOSIS — E11.9: ICD-10-CM

## 2023-01-17 DIAGNOSIS — Z79.51: ICD-10-CM

## 2023-01-17 DIAGNOSIS — R07.9: Primary | ICD-10-CM

## 2023-01-17 DIAGNOSIS — F43.10: ICD-10-CM

## 2023-01-17 DIAGNOSIS — F32.A: ICD-10-CM

## 2023-01-17 DIAGNOSIS — Z79.899: ICD-10-CM

## 2023-01-17 DIAGNOSIS — Z79.84: ICD-10-CM

## 2023-01-17 LAB
ALBUMIN SERPL BCG-MCNC: 3.6 G/DL (ref 3.2–5.2)
ALP SERPL-CCNC: 67 U/L (ref 46–116)
ALT SERPL W P-5'-P-CCNC: 44 U/L (ref 7–40)
AST SERPL-CCNC: 28 U/L (ref ?–34)
BASOPHILS # BLD AUTO: 0.1 10^3/UL (ref 0–0.2)
BASOPHILS NFR BLD AUTO: 0.6 % (ref 0–1)
BILIRUB CONJ SERPL-MCNC: < 0.1 MG/DL (ref ?–0.4)
BILIRUB SERPL-MCNC: 0.3 MG/DL (ref 0.3–1.2)
BUN SERPL-MCNC: 17 MG/DL (ref 9–23)
CALCIUM SERPL-MCNC: 8.9 MG/DL (ref 8.3–10.6)
CHLORIDE SERPL-SCNC: 103 MMOL/L (ref 98–107)
CK MB CFR.DF SERPL CALC: 1.4
CK MB SERPL-MCNC: < 1 NG/ML (ref ?–3.6)
CK SERPL-CCNC: 71 U/L (ref 46–171)
CO2 SERPL-SCNC: 24 MMOL/L (ref 20–31)
CREAT SERPL-MCNC: 0.76 MG/DL (ref 0.7–1.3)
EOSINOPHIL # BLD AUTO: 0.4 10^3/UL (ref 0–0.5)
EOSINOPHIL NFR BLD AUTO: 3.8 % (ref 0–3)
GFR SERPL CREATININE-BSD FRML MDRD: > 60 ML/MIN/{1.73_M2} (ref 49–?)
GLUCOSE SERPL-MCNC: 236 MG/DL (ref 74–106)
HCT VFR BLD AUTO: 41.5 % (ref 42–52)
HGB BLD-MCNC: 13.5 G/DL (ref 13.5–17.5)
LIPASE SERPL-CCNC: 39 U/L (ref 12–53)
LYMPHOCYTES # BLD AUTO: 3.1 10^3/UL (ref 1.5–5)
LYMPHOCYTES NFR BLD AUTO: 30 % (ref 24–44)
MCH RBC QN AUTO: 28.2 PG (ref 27–33)
MCHC RBC AUTO-ENTMCNC: 32.5 G/DL (ref 32–36.5)
MCV RBC AUTO: 86.6 FL (ref 80–96)
MONOCYTES # BLD AUTO: 0.8 10^3/UL (ref 0–0.8)
MONOCYTES NFR BLD AUTO: 7.4 % (ref 2–8)
NEUTROPHILS # BLD AUTO: 6 10^3/UL (ref 1.5–8.5)
NEUTROPHILS NFR BLD AUTO: 58 % (ref 36–66)
PLATELET # BLD AUTO: 292 10^3/UL (ref 150–450)
POTASSIUM SERPL-SCNC: 4.5 MMOL/L (ref 3.5–5.1)
PROT SERPL-MCNC: 7.1 G/DL (ref 5.7–8.2)
RBC # BLD AUTO: 4.79 10^6/UL (ref 4.3–6.1)
RSV RNA NPH QL NAA+PROBE: NEGATIVE
SODIUM SERPL-SCNC: 137 MMOL/L (ref 136–145)
TSH SERPL DL<=0.005 MIU/L-ACNC: 5.04 UIU/ML (ref 0.55–4.78)
WBC # BLD AUTO: 10.3 10^3/UL (ref 4–10)

## 2023-01-17 PROCEDURE — 85025 COMPLETE CBC W/AUTO DIFF WBC: CPT

## 2023-01-17 PROCEDURE — 93005 ELECTROCARDIOGRAM TRACING: CPT

## 2023-01-17 PROCEDURE — 96374 THER/PROPH/DIAG INJ IV PUSH: CPT

## 2023-01-17 PROCEDURE — 83880 ASSAY OF NATRIURETIC PEPTIDE: CPT

## 2023-01-17 PROCEDURE — 85379 FIBRIN DEGRADATION QUANT: CPT

## 2023-01-17 PROCEDURE — 82553 CREATINE MB FRACTION: CPT

## 2023-01-17 PROCEDURE — 83690 ASSAY OF LIPASE: CPT

## 2023-01-17 PROCEDURE — 84484 ASSAY OF TROPONIN QUANT: CPT

## 2023-01-17 PROCEDURE — 80076 HEPATIC FUNCTION PANEL: CPT

## 2023-01-17 PROCEDURE — 82550 ASSAY OF CK (CPK): CPT

## 2023-01-17 PROCEDURE — 80047 BASIC METABLC PNL IONIZED CA: CPT

## 2023-01-17 PROCEDURE — 87631 RESP VIRUS 3-5 TARGETS: CPT

## 2023-01-17 PROCEDURE — 80048 BASIC METABOLIC PNL TOTAL CA: CPT

## 2023-01-17 PROCEDURE — 71275 CT ANGIOGRAPHY CHEST: CPT

## 2023-01-17 PROCEDURE — 99285 EMERGENCY DEPT VISIT HI MDM: CPT

## 2023-01-17 PROCEDURE — 71045 X-RAY EXAM CHEST 1 VIEW: CPT

## 2023-01-17 PROCEDURE — 94760 N-INVAS EAR/PLS OXIMETRY 1: CPT

## 2023-01-17 PROCEDURE — 93041 RHYTHM ECG TRACING: CPT

## 2023-01-17 PROCEDURE — 84443 ASSAY THYROID STIM HORMONE: CPT

## 2023-10-16 ENCOUNTER — HOSPITAL ENCOUNTER (OUTPATIENT)
Dept: HOSPITAL 53 - M RAD | Age: 63
End: 2023-10-16
Payer: OTHER GOVERNMENT

## 2023-10-16 DIAGNOSIS — R22.1: Primary | ICD-10-CM

## 2023-10-16 PROCEDURE — 70491 CT SOFT TISSUE NECK W/DYE: CPT

## 2023-12-29 ENCOUNTER — HOSPITAL ENCOUNTER (OUTPATIENT)
Dept: HOSPITAL 53 - M PLARAD | Age: 63
End: 2023-12-29
Payer: OTHER GOVERNMENT

## 2023-12-29 DIAGNOSIS — M54.16: ICD-10-CM

## 2023-12-29 DIAGNOSIS — M48.061: Primary | ICD-10-CM

## 2024-04-18 ENCOUNTER — HOSPITAL ENCOUNTER (EMERGENCY)
Dept: HOSPITAL 53 - M ED | Age: 64
Discharge: LEFT BEFORE DISCHARGE | End: 2024-04-18
Payer: OTHER GOVERNMENT

## 2024-04-18 VITALS — OXYGEN SATURATION: 96 % | SYSTOLIC BLOOD PRESSURE: 148 MMHG | DIASTOLIC BLOOD PRESSURE: 78 MMHG | TEMPERATURE: 97.4 F

## 2024-04-18 VITALS — HEIGHT: 72 IN | BODY MASS INDEX: 37.21 KG/M2 | WEIGHT: 274.7 LBS

## 2024-04-18 DIAGNOSIS — Z53.21: Primary | ICD-10-CM

## 2024-06-13 ENCOUNTER — HOSPITAL ENCOUNTER (OUTPATIENT)
Dept: HOSPITAL 53 - M OPP | Age: 64
Discharge: HOME | End: 2024-06-13
Attending: INTERNAL MEDICINE
Payer: OTHER GOVERNMENT

## 2024-06-13 VITALS — DIASTOLIC BLOOD PRESSURE: 63 MMHG | SYSTOLIC BLOOD PRESSURE: 140 MMHG | OXYGEN SATURATION: 94 %

## 2024-06-13 VITALS — WEIGHT: 276.2 LBS | BODY MASS INDEX: 37.41 KG/M2 | HEIGHT: 72 IN

## 2024-06-13 VITALS — TEMPERATURE: 98.5 F

## 2024-06-13 DIAGNOSIS — R11.2: ICD-10-CM

## 2024-06-13 DIAGNOSIS — R13.11: ICD-10-CM

## 2024-06-13 DIAGNOSIS — Z99.89: ICD-10-CM

## 2024-06-13 DIAGNOSIS — G47.30: ICD-10-CM

## 2024-06-13 DIAGNOSIS — R13.10: Primary | ICD-10-CM

## 2024-06-13 PROCEDURE — 43249 ESOPH EGD DILATION <30 MM: CPT

## 2024-06-13 PROCEDURE — 43239 EGD BIOPSY SINGLE/MULTIPLE: CPT

## 2024-06-13 PROCEDURE — 88305 TISSUE EXAM BY PATHOLOGIST: CPT

## 2024-06-13 RX ADMIN — SODIUM CHLORIDE ONE MLS/HR: 9 INJECTION, SOLUTION INTRAVENOUS at 12:58

## 2024-10-15 ENCOUNTER — HOSPITAL ENCOUNTER (OUTPATIENT)
Dept: HOSPITAL 53 - M SFHCDERM | Age: 64
End: 2024-10-15
Attending: NURSE PRACTITIONER
Payer: OTHER GOVERNMENT

## 2024-10-15 DIAGNOSIS — L85.9: ICD-10-CM

## 2024-10-15 DIAGNOSIS — L30.8: Primary | ICD-10-CM

## 2024-10-15 PROCEDURE — 11104 PUNCH BX SKIN SINGLE LESION: CPT

## 2024-10-15 PROCEDURE — 88305 TISSUE EXAM BY PATHOLOGIST: CPT

## 2024-11-06 ENCOUNTER — HOSPITAL ENCOUNTER (EMERGENCY)
Dept: HOSPITAL 53 - M ED | Age: 64
Discharge: HOME | End: 2024-11-06
Payer: MEDICARE

## 2024-11-06 VITALS — HEIGHT: 73 IN | WEIGHT: 271.83 LBS | BODY MASS INDEX: 36.03 KG/M2

## 2024-11-06 VITALS — TEMPERATURE: 98.9 F

## 2024-11-06 VITALS — DIASTOLIC BLOOD PRESSURE: 79 MMHG | OXYGEN SATURATION: 95 % | SYSTOLIC BLOOD PRESSURE: 136 MMHG

## 2024-11-06 DIAGNOSIS — E11.9: ICD-10-CM

## 2024-11-06 DIAGNOSIS — Z79.899: ICD-10-CM

## 2024-11-06 DIAGNOSIS — R16.2: ICD-10-CM

## 2024-11-06 DIAGNOSIS — M16.0: ICD-10-CM

## 2024-11-06 DIAGNOSIS — E78.5: ICD-10-CM

## 2024-11-06 DIAGNOSIS — Z79.4: ICD-10-CM

## 2024-11-06 DIAGNOSIS — F32.A: ICD-10-CM

## 2024-11-06 DIAGNOSIS — Z79.82: ICD-10-CM

## 2024-11-06 DIAGNOSIS — F41.9: ICD-10-CM

## 2024-11-06 DIAGNOSIS — Z79.83: ICD-10-CM

## 2024-11-06 DIAGNOSIS — M54.50: ICD-10-CM

## 2024-11-06 DIAGNOSIS — Z79.52: ICD-10-CM

## 2024-11-06 DIAGNOSIS — K52.9: Primary | ICD-10-CM

## 2024-11-06 DIAGNOSIS — J98.11: ICD-10-CM

## 2024-11-06 LAB
ALBUMIN SERPL BCG-MCNC: 3.5 G/DL (ref 3.2–5.2)
ALP SERPL-CCNC: 73 U/L (ref 40–129)
ALT SERPL W P-5'-P-CCNC: 53 U/L (ref 7–40)
AST SERPL-CCNC: 37 U/L (ref ?–34)
BASOPHILS # BLD AUTO: 0 10^3/UL (ref 0–0.2)
BASOPHILS NFR BLD AUTO: 0.2 % (ref 0–1)
BILIRUB CONJ SERPL-MCNC: < 0.1 MG/DL (ref ?–0.4)
BILIRUB SERPL-MCNC: 0.3 MG/DL (ref 0.3–1.2)
BUN SERPL-MCNC: 18 MG/DL (ref 9–23)
CALCIUM SERPL-MCNC: 9.8 MG/DL (ref 8.3–10.6)
CHLORIDE SERPL-SCNC: 103 MMOL/L (ref 98–107)
CO2 SERPL-SCNC: 25 MMOL/L (ref 20–31)
CREAT SERPL-MCNC: 0.7 MG/DL (ref 0.7–1.3)
EOSINOPHIL # BLD AUTO: 0.3 10^3/UL (ref 0–0.5)
EOSINOPHIL NFR BLD AUTO: 2.3 % (ref 0–3)
GFR SERPL CREATININE-BSD FRML MDRD: > 60 ML/MIN/{1.73_M2} (ref 49–?)
GLUCOSE SERPL-MCNC: 190 MG/DL (ref 74–106)
HCT VFR BLD AUTO: 43.1 % (ref 42–52)
HGB BLD-MCNC: 14.5 G/DL (ref 13.5–17.5)
LIPASE SERPL-CCNC: 36 U/L (ref 12–53)
LITHIUM SERPL-SCNC: 0.3 MMOL/L (ref 1–1.2)
LYMPHOCYTES # BLD AUTO: 2.9 10^3/UL (ref 1.5–5)
LYMPHOCYTES NFR BLD AUTO: 21.4 % (ref 24–44)
MCH RBC QN AUTO: 28.7 PG (ref 27–33)
MCHC RBC AUTO-ENTMCNC: 33.6 G/DL (ref 32–36.5)
MCV RBC AUTO: 85.2 FL (ref 80–96)
MONOCYTES # BLD AUTO: 1.1 10^3/UL (ref 0–0.8)
MONOCYTES NFR BLD AUTO: 8.2 % (ref 2–8)
NEUTROPHILS # BLD AUTO: 9.1 10^3/UL (ref 1.5–8.5)
NEUTROPHILS NFR BLD AUTO: 67.5 % (ref 36–66)
PLATELET # BLD AUTO: (no result) 10^3/UL (ref 150–450)
POTASSIUM SERPL-SCNC: 4.9 MMOL/L (ref 3.5–5.1)
PROT SERPL-MCNC: 7.6 G/DL (ref 5.7–8.2)
RBC # BLD AUTO: 5.06 10^6/UL (ref 4.3–6.1)
SODIUM SERPL-SCNC: 135 MMOL/L (ref 136–145)
WBC # BLD AUTO: 13.4 10^3/UL (ref 4–10)

## 2024-11-06 PROCEDURE — 87581 M.PNEUMON DNA AMP PROBE: CPT

## 2024-11-06 PROCEDURE — 87633 RESP VIRUS 12-25 TARGETS: CPT

## 2024-11-06 PROCEDURE — 85025 COMPLETE CBC W/AUTO DIFF WBC: CPT

## 2024-11-06 PROCEDURE — 80178 ASSAY OF LITHIUM: CPT

## 2024-11-06 PROCEDURE — 96361 HYDRATE IV INFUSION ADD-ON: CPT

## 2024-11-06 PROCEDURE — 80076 HEPATIC FUNCTION PANEL: CPT

## 2024-11-06 PROCEDURE — 96374 THER/PROPH/DIAG INJ IV PUSH: CPT

## 2024-11-06 PROCEDURE — 87486 CHLMYD PNEUM DNA AMP PROBE: CPT

## 2024-11-06 PROCEDURE — 74177 CT ABD & PELVIS W/CONTRAST: CPT

## 2024-11-06 PROCEDURE — 83690 ASSAY OF LIPASE: CPT

## 2024-11-06 PROCEDURE — 87798 DETECT AGENT NOS DNA AMP: CPT

## 2024-11-06 PROCEDURE — 99284 EMERGENCY DEPT VISIT MOD MDM: CPT

## 2024-11-06 PROCEDURE — 80048 BASIC METABOLIC PNL TOTAL CA: CPT

## 2024-11-06 RX ADMIN — SODIUM CHLORIDE ONE MLS/HR: 9 INJECTION, SOLUTION INTRAVENOUS at 06:06

## 2024-11-06 RX ADMIN — ONDANSETRON ONE MG: 2 INJECTION INTRAMUSCULAR; INTRAVENOUS at 03:53

## 2024-11-06 RX ADMIN — SODIUM CHLORIDE ONE MLS/HR: 9 INJECTION, SOLUTION INTRAVENOUS at 04:00

## 2024-12-10 ENCOUNTER — HOSPITAL ENCOUNTER (OUTPATIENT)
Dept: HOSPITAL 53 - M SLEEP | Age: 64
End: 2024-12-10
Payer: OTHER GOVERNMENT

## 2024-12-10 DIAGNOSIS — G47.33: Primary | ICD-10-CM

## 2025-04-07 ENCOUNTER — HOSPITAL ENCOUNTER (OUTPATIENT)
Dept: HOSPITAL 53 - M RAD | Age: 65
End: 2025-04-07
Payer: OTHER GOVERNMENT

## 2025-04-07 DIAGNOSIS — D35.2: Primary | ICD-10-CM

## 2025-04-07 PROCEDURE — 70553 MRI BRAIN STEM W/O & W/DYE: CPT

## 2025-04-20 ENCOUNTER — HOSPITAL ENCOUNTER (EMERGENCY)
Dept: HOSPITAL 53 - M ED | Age: 65
LOS: 1 days | Discharge: LEFT BEFORE DISCHARGE | End: 2025-04-21
Payer: OTHER GOVERNMENT

## 2025-04-20 VITALS — BODY MASS INDEX: 35.53 KG/M2 | WEIGHT: 262.35 LBS | HEIGHT: 72 IN

## 2025-04-20 VITALS — DIASTOLIC BLOOD PRESSURE: 62 MMHG | TEMPERATURE: 101.2 F | OXYGEN SATURATION: 97 % | SYSTOLIC BLOOD PRESSURE: 130 MMHG

## 2025-04-20 DIAGNOSIS — Z53.21: Primary | ICD-10-CM
